# Patient Record
Sex: FEMALE | Race: WHITE | NOT HISPANIC OR LATINO | Employment: FULL TIME | URBAN - METROPOLITAN AREA
[De-identification: names, ages, dates, MRNs, and addresses within clinical notes are randomized per-mention and may not be internally consistent; named-entity substitution may affect disease eponyms.]

---

## 2023-11-08 PROBLEM — D22.5 MELANOCYTIC NEVI OF TRUNK: Status: ACTIVE | Noted: 2023-08-11

## 2023-11-08 PROBLEM — M54.50 LOWER BACK PAIN: Status: ACTIVE | Noted: 2023-11-08

## 2023-11-08 PROBLEM — L57.8 OTHER SKIN CHANGES DUE TO CHRONIC EXPOSURE TO NONIONIZING RADIATION: Status: ACTIVE | Noted: 2023-08-11

## 2023-11-08 PROBLEM — L65.9 HAIR THINNING: Status: ACTIVE | Noted: 2023-11-08

## 2023-11-08 PROBLEM — D22.61 MELANOCYTIC NEVI OF RIGHT UPPER LIMB, INCLUDING SHOULDER: Status: ACTIVE | Noted: 2023-08-11

## 2023-11-08 PROBLEM — D22.71 MELANOCYTIC NEVI OF RIGHT LOWER LIMB, INCLUDING HIP: Status: ACTIVE | Noted: 2023-08-11

## 2023-11-08 PROBLEM — D22.72 MELANOCYTIC NEVI OF LEFT LOWER LIMB, INCLUDING HIP: Status: ACTIVE | Noted: 2023-08-11

## 2023-11-08 PROBLEM — E55.9 VITAMIN D DEFICIENCY: Status: ACTIVE | Noted: 2023-11-08

## 2023-11-08 PROBLEM — D22.62 MELANOCYTIC NEVI OF LEFT UPPER LIMB, INCLUDING SHOULDER: Status: ACTIVE | Noted: 2023-08-11

## 2023-11-08 PROBLEM — L81.4 OTHER MELANIN HYPERPIGMENTATION: Status: ACTIVE | Noted: 2023-08-11

## 2023-11-08 PROBLEM — L70.9 ACNE: Status: ACTIVE | Noted: 2023-08-11

## 2023-11-08 PROBLEM — R53.83 FATIGUE: Status: ACTIVE | Noted: 2023-11-08

## 2023-11-08 RX ORDER — SPIRONOLACTONE 100 MG/1
TABLET, FILM COATED ORAL
COMMUNITY
Start: 2023-08-11 | End: 2024-03-08

## 2023-11-08 RX ORDER — SPIRONOLACTONE 50 MG/1
1 TABLET, FILM COATED ORAL DAILY
COMMUNITY
Start: 2020-08-20 | End: 2023-11-10

## 2023-11-08 RX ORDER — BIOTIN 1000 MCG
TABLET,CHEWABLE ORAL
COMMUNITY
Start: 2020-08-20

## 2023-11-08 RX ORDER — TRETINOIN 0.25 MG/G
CREAM TOPICAL
COMMUNITY
Start: 2023-08-11 | End: 2023-11-10 | Stop reason: ALTCHOICE

## 2023-11-08 RX ORDER — ERGOCALCIFEROL 1.25 MG/1
1 CAPSULE ORAL
COMMUNITY
Start: 2019-10-01

## 2023-11-10 ENCOUNTER — OFFICE VISIT (OUTPATIENT)
Dept: DERMATOLOGY | Facility: CLINIC | Age: 23
End: 2023-11-10
Payer: COMMERCIAL

## 2023-11-10 DIAGNOSIS — L70.0 ACNE VULGARIS: Primary | ICD-10-CM

## 2023-11-10 PROCEDURE — 1036F TOBACCO NON-USER: CPT | Performed by: DERMATOLOGY

## 2023-11-10 PROCEDURE — 99214 OFFICE O/P EST MOD 30 MIN: CPT | Performed by: DERMATOLOGY

## 2023-11-10 RX ORDER — TRETINOIN 0.5 MG/G
CREAM TOPICAL
Qty: 20 G | Refills: 11 | Status: SHIPPED | OUTPATIENT
Start: 2023-11-10 | End: 2023-11-13 | Stop reason: SDUPTHER

## 2023-11-10 RX ORDER — DOXYCYCLINE 100 MG/1
100 CAPSULE ORAL 2 TIMES DAILY
Qty: 180 CAPSULE | Refills: 0 | Status: SHIPPED | OUTPATIENT
Start: 2023-11-10 | End: 2024-02-08

## 2023-11-10 ASSESSMENT — DERMATOLOGY PATIENT ASSESSMENT
FOR PATIENTS COMING IN FOR A FOLLOW-UP VISIT - HAVE THERE BEEN ANY CHANGES IN YOUR HEALTH SINCE YOUR LAST VISIT: NO
ARE YOU AN ORGAN TRANSPLANT RECIPIENT: NO
DO YOU HAVE ANY NEW OR CHANGING LESIONS: YES

## 2023-11-10 ASSESSMENT — DERMATOLOGY QUALITY OF LIFE (QOL) ASSESSMENT
RATE HOW EMOTIONALLY BOTHERED YOU ARE BY YOUR SKIN PROBLEM (FOR EXAMPLE, WORRY, EMBARRASSMENT, FRUSTRATION): 3
RATE HOW BOTHERED YOU ARE BY SYMPTOMS OF YOUR SKIN PROBLEM (EG, ITCHING, STINGING BURNING, HURTING OR SKIN IRRITATION): 3
ARE THERE EXCLUSIONS OR EXCEPTIONS FOR THE QUALITY OF LIFE ASSESSMENT: NO
RATE HOW BOTHERED YOU ARE BY EFFECTS OF YOUR SKIN PROBLEMS ON YOUR ACTIVITIES (EG, GOING OUT, ACCOMPLISHING WHAT YOU WANT, WORK ACTIVITIES OR YOUR RELATIONSHIPS WITH OTHERS): 0 - NEVER BOTHERED
WHAT SINGLE SKIN CONDITION LISTED BELOW IS THE PATIENT ANSWERING THE QUALITY-OF-LIFE ASSESSMENT QUESTIONS ABOUT: ACNE

## 2023-11-10 ASSESSMENT — ITCH NUMERIC RATING SCALE: HOW SEVERE IS YOUR ITCHING?: 0

## 2023-11-10 NOTE — PROGRESS NOTES
Subjective     Kristina Kelly is a 23 y.o. female who presents for the following: Acne (Pt here following up on Acne, located on face. Current treatment Spironolactone 100mg every day, Panoxyl every day, Tretinoin 0.025% every night. Pt is not on BC. Pt reports acne is worse since last visit).     She reports getting new pimples daily to every few days, has not noted flaring with menstrual cycles. She is concerned about scarring from some of the deep/painful lesions.    Review of Systems:  No other skin or systemic complaints other than what is documented elsewhere in the note.    The following portions of the chart were reviewed this encounter and updated as appropriate:       Specialty Problems          Dermatology Problems    Acne    Melanocytic nevi of left lower limb, including hip    Melanocytic nevi of left upper limb, including shoulder    Melanocytic nevi of right lower limb, including hip    Melanocytic nevi of right upper limb, including shoulder    Melanocytic nevi of trunk    Other melanin hyperpigmentation    Other skin changes due to chronic exposure to nonionizing radiation    Hair thinning     Past Medical History:  Kristina Kelly  has a past medical history of Other conditions influencing health status.    Past Surgical History:  Kristina Kelly  has a past surgical history that includes Other surgical history (08/23/2018).    Family History:  Patient family history includes Eczema in her sibling; Melanoma in an other family member.    Social History:  Kristina Kelly  reports that she has never smoked. She has never used smokeless tobacco. Alcohol use questions deferred to the physician. Drug use questions deferred to the physician.    Allergies:  Patient has no known allergies.    Current Medications / CAM's:    Current Outpatient Medications:     biotin 1,000 mcg tablet,chewable, Chew., Disp: , Rfl:     doxycycline (Vibramycin) 100 mg capsule, Take 1 capsule (100 mg) by mouth 2 times a day. Take  with at least 8 ounces (large glass) of water, do not lie down for 30 minutes after, Disp: 180 capsule, Rfl: 0    ergocalciferol (Vitamin D-2) 1.25 MG (36062 UT) capsule, Take 1 capsule (1,250 mcg) by mouth 1 (one) time per week., Disp: , Rfl:     spironolactone (Aldactone) 100 mg tablet, 1 Tablet, Disp: , Rfl:     tretinoin (Retin-A) 0.05 % cream, Apply a thin layer to entire face up to every night at bedtime as tolerated. Start with use 2-3 nights per week, and use up to nightly unless dryness/irritation occurs, Disp: 20 g, Rfl: 11     Objective   Well appearing patient in no apparent distress; mood and affect are within normal limits.    A focused examination was performed including face. All findings within normal limits unless otherwise noted below.    Head - Anterior (Face)  Erythematous papules and nodules on the cheeks and chin; few scattered on forehead         Assessment/Plan   Acne vulgaris  Head - Anterior (Face)    The chronic and intermittently flaring nature of acne was reviewed with the patient today. Patient advised that acne is multifactorial. Treatment options were reviewed with the patient. Advised that typically takes 2-3 months to see 60-80% improvement and treatments may worsen acne appearance prior to improvement.     Wash face twice daily  Do not spot treat, treat the entire surface area to treat current breakouts and prevent new breakouts    Treatment recommendations:  - Continue Panoxyl 4% cleanser daily and may consider rinsing in the shower as this can bleach fabrics, gentle cleanser at night  - START doxycycline 100mg BID x3mo  Discussed common side-effects including H/A, GI upset, photosensitivity. Take with food and a full glass of water, remain upright for at least 30min after taking. Avoid prolonged sun exposure while on this medication  - STOP spironolactone due to lack of efficacy  - INCREASE tretinoin to 0.05% cream up to nightly as tolerated. Risks including dryness, irritation  reviewed. Can take breaks, use moisturizer if irritation occurs. This medication must be stopped if pregnancy is considered    Discussed that we can consider isotretinoin in the future if not controlled on the above plan    Related Procedures  Follow Up In Dermatology - Established Patient    Related Medications  doxycycline (Vibramycin) 100 mg capsule  Take 1 capsule (100 mg) by mouth 2 times a day. Take with at least 8 ounces (large glass) of water, do not lie down for 30 minutes after    tretinoin (Retin-A) 0.05 % cream  Apply a thin layer to entire face up to every night at bedtime as tolerated. Start with use 2-3 nights per week, and use up to nightly unless dryness/irritation occurs       11/10/2023 2:38 PM  Teresa Ceballos MD  Dermatology Resident, PGY-4

## 2023-11-13 ENCOUNTER — TELEPHONE (OUTPATIENT)
Dept: DERMATOLOGY | Facility: CLINIC | Age: 23
End: 2023-11-13
Payer: COMMERCIAL

## 2023-11-13 DIAGNOSIS — L70.0 ACNE VULGARIS: ICD-10-CM

## 2023-11-13 RX ORDER — TRETINOIN 0.5 MG/G
CREAM TOPICAL
Qty: 20 G | Refills: 11 | Status: SHIPPED | OUTPATIENT
Start: 2023-11-13

## 2023-11-13 NOTE — TELEPHONE ENCOUNTER
Pt would like Tretinoin prescription sent to [facility] in [place], not as expensive there.

## 2023-12-13 RX ORDER — SPIRONOLACTONE 100 MG/1
100 TABLET, FILM COATED ORAL DAILY
Qty: 90 TABLET | Refills: 1 | OUTPATIENT
Start: 2023-12-13

## 2024-03-08 ENCOUNTER — OFFICE VISIT (OUTPATIENT)
Dept: DERMATOLOGY | Facility: CLINIC | Age: 24
End: 2024-03-08
Payer: COMMERCIAL

## 2024-03-08 DIAGNOSIS — L70.0 ACNE VULGARIS: ICD-10-CM

## 2024-03-08 PROCEDURE — 99212 OFFICE O/P EST SF 10 MIN: CPT | Performed by: DERMATOLOGY

## 2024-03-08 PROCEDURE — 1036F TOBACCO NON-USER: CPT | Performed by: DERMATOLOGY

## 2024-03-08 ASSESSMENT — DERMATOLOGY QUALITY OF LIFE (QOL) ASSESSMENT
RATE HOW BOTHERED YOU ARE BY EFFECTS OF YOUR SKIN PROBLEMS ON YOUR ACTIVITIES (EG, GOING OUT, ACCOMPLISHING WHAT YOU WANT, WORK ACTIVITIES OR YOUR RELATIONSHIPS WITH OTHERS): 0 - NEVER BOTHERED
ARE THERE EXCLUSIONS OR EXCEPTIONS FOR THE QUALITY OF LIFE ASSESSMENT: NO
RATE HOW EMOTIONALLY BOTHERED YOU ARE BY YOUR SKIN PROBLEM (FOR EXAMPLE, WORRY, EMBARRASSMENT, FRUSTRATION): 1
RATE HOW BOTHERED YOU ARE BY SYMPTOMS OF YOUR SKIN PROBLEM (EG, ITCHING, STINGING BURNING, HURTING OR SKIN IRRITATION): 1
WHAT SINGLE SKIN CONDITION LISTED BELOW IS THE PATIENT ANSWERING THE QUALITY-OF-LIFE ASSESSMENT QUESTIONS ABOUT: ACNE

## 2024-03-08 ASSESSMENT — DERMATOLOGY PATIENT ASSESSMENT
DO YOU USE A TANNING BED: NO
ARE YOU TRYING TO GET PREGNANT: NO
DO YOU USE SUNSCREEN: DAILY
ARE YOU AN ORGAN TRANSPLANT RECIPIENT: NO
ARE YOU ON BIRTH CONTROL: NO
DO YOU HAVE ANY NEW OR CHANGING LESIONS: NO
FOR PATIENTS COMING IN FOR A FOLLOW-UP VISIT - HAVE THERE BEEN ANY CHANGES IN YOUR HEALTH SINCE YOUR LAST VISIT: NO

## 2024-03-08 ASSESSMENT — PATIENT GLOBAL ASSESSMENT (PGA): PATIENT GLOBAL ASSESSMENT: PATIENT GLOBAL ASSESSMENT:  2 - MILD

## 2024-03-08 ASSESSMENT — ITCH NUMERIC RATING SCALE: HOW SEVERE IS YOUR ITCHING?: 0

## 2024-03-08 NOTE — PROGRESS NOTES
Isaac Kelly is a 23 y.o. female who presents for the following: Acne (Pt here for 3 month Acne follow up. Acne is located on face. Current tx-Doxycycline 100mg BID x3 months-completed, BPO wash QAM, Tretinoin 0.05% cream QHS. Per pt acne has improved. ).     Review of Systems:  No other skin or systemic complaints other than what is documented elsewhere in the note.    The following portions of the chart were reviewed this encounter and updated as appropriate:          Skin Cancer History  No skin cancer on file.      Specialty Problems          Dermatology Problems    Acne    Melanocytic nevi of left lower limb, including hip    Melanocytic nevi of left upper limb, including shoulder    Melanocytic nevi of right lower limb, including hip    Melanocytic nevi of right upper limb, including shoulder    Melanocytic nevi of trunk    Other melanin hyperpigmentation    Other skin changes due to chronic exposure to nonionizing radiation    Hair thinning        Objective   Well appearing patient in no apparent distress; mood and affect are within normal limits.    A focused skin examination was performed of the face. All findings within normal limits unless otherwise noted below.    Assessment/Plan   1. Acne vulgaris  Head - Anterior (Face)  Clear    The chronic and intermittently flaring nature of acne was reviewed with the patient today.     S/p 3 months of doxycycline, last dose yesterday and she is completely clear using benzoyl peroxide and tretinoin daily    Continue topicals alone, no need to continue doxycycline. If begins to flare may consider low dose doxycycline.    Follow up in 1 year.    Related Procedures  Follow Up In Dermatology - Established Patient    Related Medications  tretinoin (Retin-A) 0.05 % cream  Apply a thin layer to entire face up to every night at bedtime as tolerated. Start with use 2-3 nights per week, and use up to nightly unless dryness/irritation occurs

## 2024-07-11 DIAGNOSIS — L70.0 ACNE VULGARIS: ICD-10-CM

## 2024-07-11 RX ORDER — DOXYCYCLINE HYCLATE 50 MG/1
CAPSULE ORAL
Qty: 30 CAPSULE | Refills: 1 | Status: SHIPPED | OUTPATIENT
Start: 2024-07-11

## 2024-07-11 RX ORDER — TRETINOIN 0.5 MG/G
CREAM TOPICAL
Qty: 20 G | Refills: 4 | Status: SHIPPED | OUTPATIENT
Start: 2024-07-11 | End: 2024-07-11

## 2024-07-11 RX ORDER — TRETINOIN 0.5 MG/G
CREAM TOPICAL
Qty: 20 G | Refills: 4 | Status: SHIPPED | OUTPATIENT
Start: 2024-07-11

## 2024-09-08 DIAGNOSIS — L70.0 ACNE VULGARIS: ICD-10-CM

## 2024-09-11 ENCOUNTER — TELEPHONE (OUTPATIENT)
Dept: DERMATOLOGY | Facility: CLINIC | Age: 24
End: 2024-09-11
Payer: COMMERCIAL

## 2024-09-11 NOTE — TELEPHONE ENCOUNTER
Called patient with regards to refill request from pharmacy for 50mg daily of doxycycline.  Left message with patient inquiring if she is still taking this and to contact office.

## 2024-09-12 RX ORDER — DOXYCYCLINE HYCLATE 50 MG/1
CAPSULE ORAL
Qty: 30 CAPSULE | Refills: 1 | OUTPATIENT
Start: 2024-09-12

## 2024-09-13 DIAGNOSIS — L70.0 ACNE VULGARIS: ICD-10-CM

## 2024-09-13 RX ORDER — DOXYCYCLINE HYCLATE 50 MG/1
CAPSULE ORAL
Qty: 30 CAPSULE | Refills: 1 | Status: SHIPPED | OUTPATIENT
Start: 2024-09-13

## 2024-09-13 RX ORDER — TRETINOIN 0.5 MG/G
CREAM TOPICAL
Qty: 20 G | Refills: 2 | Status: SHIPPED | OUTPATIENT
Start: 2024-09-13

## 2025-03-14 ENCOUNTER — APPOINTMENT (OUTPATIENT)
Dept: DERMATOLOGY | Facility: CLINIC | Age: 25
End: 2025-03-14
Payer: COMMERCIAL